# Patient Record
Sex: MALE | Race: WHITE | NOT HISPANIC OR LATINO | Employment: OTHER | URBAN - METROPOLITAN AREA
[De-identification: names, ages, dates, MRNs, and addresses within clinical notes are randomized per-mention and may not be internally consistent; named-entity substitution may affect disease eponyms.]

---

## 2019-03-18 NOTE — PATIENT DISCUSSION
LATTICE DEGENERATION OD: THINNING IN PERIPHERAL RETINA. NO HOLE ALL THE WAY THROUGH THE RETINA  - NO NEED FOR TREATMENT AT THIS TIME. PT TO CALL IF ANY DECREASE IN VISION, INCREASE IN FLOATERS OR FLASHES OF LIGHT.

## 2019-06-26 ENCOUNTER — EST. PATIENT EMERGENCY (OUTPATIENT)
Dept: URBAN - METROPOLITAN AREA CLINIC 43 | Facility: CLINIC | Age: 79
End: 2019-06-26

## 2019-06-26 DIAGNOSIS — H10.33: ICD-10-CM

## 2019-06-26 PROCEDURE — 92012 INTRM OPH EXAM EST PATIENT: CPT

## 2019-06-26 RX ORDER — NEOMYCIN SULFATE, POLYMYXIN B SULFATE AND DEXAMETHASONE 3.5; 10000; 1 MG/ML; [USP'U]/ML; MG/ML: 1 SUSPENSION OPHTHALMIC

## 2019-06-26 ASSESSMENT — VISUAL ACUITY
OD_CC: 20/30
OS_CC: 20/30

## 2019-08-19 ENCOUNTER — ESTABLISHED COMPREHENSIVE EXAM (OUTPATIENT)
Dept: URBAN - METROPOLITAN AREA CLINIC 43 | Facility: CLINIC | Age: 79
End: 2019-08-19

## 2019-08-19 DIAGNOSIS — H50.10: ICD-10-CM

## 2019-08-19 DIAGNOSIS — H18.59: ICD-10-CM

## 2019-08-19 DIAGNOSIS — H04.123: ICD-10-CM

## 2019-08-19 DIAGNOSIS — Z96.1: ICD-10-CM

## 2019-08-19 PROCEDURE — 92015 DETERMINE REFRACTIVE STATE: CPT

## 2019-08-19 PROCEDURE — 92014 COMPRE OPH EXAM EST PT 1/>: CPT

## 2019-08-19 ASSESSMENT — TONOMETRY
OD_IOP_MMHG: 16
OS_IOP_MMHG: 14

## 2019-08-19 ASSESSMENT — VISUAL ACUITY
OD_CC: J1
OD_SC: J1
OS_SC: J3
OS_CC: 20/25-2
OD_SC: 20/40-1
OD_CC: 20/40
OS_CC: J1
OS_SC: 20/25-2

## 2020-11-04 ENCOUNTER — ESTABLISHED COMPREHENSIVE EXAM (OUTPATIENT)
Dept: URBAN - METROPOLITAN AREA CLINIC 43 | Facility: CLINIC | Age: 80
End: 2020-11-04

## 2020-11-04 DIAGNOSIS — H50.10: ICD-10-CM

## 2020-11-04 DIAGNOSIS — H18.599: ICD-10-CM

## 2020-11-04 DIAGNOSIS — H04.123: ICD-10-CM

## 2020-11-04 DIAGNOSIS — Z96.1: ICD-10-CM

## 2020-11-04 PROCEDURE — 92015 DETERMINE REFRACTIVE STATE: CPT

## 2020-11-04 PROCEDURE — 92014 COMPRE OPH EXAM EST PT 1/>: CPT

## 2020-11-04 ASSESSMENT — VISUAL ACUITY
OD_CC: 20/30-2
OD_SC: J3
OS_CC: J1
OS_CC: 20/30-2
OD_CC: J1
OS_SC: 20/30-1+1
OS_SC: J6-
OD_SC: 20/30-1+2

## 2020-11-04 ASSESSMENT — TONOMETRY
OD_IOP_MMHG: 14
OS_IOP_MMHG: 12

## 2022-01-31 ENCOUNTER — COMPREHENSIVE EXAM (OUTPATIENT)
Dept: URBAN - METROPOLITAN AREA CLINIC 43 | Facility: CLINIC | Age: 82
End: 2022-01-31

## 2022-01-31 DIAGNOSIS — H18.593: ICD-10-CM

## 2022-01-31 DIAGNOSIS — H50.10: ICD-10-CM

## 2022-01-31 DIAGNOSIS — H04.123: ICD-10-CM

## 2022-01-31 DIAGNOSIS — Z96.1: ICD-10-CM

## 2022-01-31 PROCEDURE — 92014 COMPRE OPH EXAM EST PT 1/>: CPT

## 2022-01-31 PROCEDURE — 92015 DETERMINE REFRACTIVE STATE: CPT

## 2022-01-31 ASSESSMENT — VISUAL ACUITY
OD_SC: 20/25+2
OS_SC: J4
OS_CC: 20/20-2
OD_SC: J2-
OD_CC: 20/30+1
OS_CC: J1
OD_CC: J1
OS_SC: 20/25-2

## 2022-01-31 ASSESSMENT — TONOMETRY
OS_IOP_MMHG: 12
OD_IOP_MMHG: 12

## 2022-07-05 ENCOUNTER — EMERGENCY VISIT (OUTPATIENT)
Dept: URBAN - METROPOLITAN AREA CLINIC 43 | Facility: CLINIC | Age: 82
End: 2022-07-05

## 2022-07-05 DIAGNOSIS — Z96.1: ICD-10-CM

## 2022-07-05 DIAGNOSIS — H18.593: ICD-10-CM

## 2022-07-05 DIAGNOSIS — H04.123: ICD-10-CM

## 2022-07-05 DIAGNOSIS — H50.10: ICD-10-CM

## 2022-07-05 PROCEDURE — 92012 INTRM OPH EXAM EST PATIENT: CPT

## 2022-07-05 ASSESSMENT — VISUAL ACUITY
OD_CC: 20/40-1
OS_CC: 20/25-2
OD_CC: J1
OS_CC: J2-

## 2023-01-31 ENCOUNTER — COMPREHENSIVE EXAM (OUTPATIENT)
Dept: URBAN - METROPOLITAN AREA CLINIC 43 | Facility: CLINIC | Age: 83
End: 2023-01-31

## 2023-01-31 DIAGNOSIS — H04.123: ICD-10-CM

## 2023-01-31 DIAGNOSIS — Z96.1: ICD-10-CM

## 2023-01-31 DIAGNOSIS — H18.593: ICD-10-CM

## 2023-01-31 DIAGNOSIS — H50.10: ICD-10-CM

## 2023-01-31 PROCEDURE — 92014 COMPRE OPH EXAM EST PT 1/>: CPT

## 2023-01-31 PROCEDURE — 92015 DETERMINE REFRACTIVE STATE: CPT

## 2023-01-31 ASSESSMENT — VISUAL ACUITY
OD_CC: 20/20-1
OD_CC: J1
OS_CC: 20/30-1
OS_CC: J2
OD_SC: J1
OS_SC: 20/40-1
OD_SC: 20/30-2
OS_SC: J6

## 2023-01-31 ASSESSMENT — TONOMETRY
OS_IOP_MMHG: 12
OD_IOP_MMHG: 13

## 2024-02-06 ENCOUNTER — COMPREHENSIVE EXAM (OUTPATIENT)
Dept: URBAN - METROPOLITAN AREA CLINIC 43 | Facility: CLINIC | Age: 84
End: 2024-02-06

## 2024-02-06 DIAGNOSIS — H04.123: ICD-10-CM

## 2024-02-06 DIAGNOSIS — H50.10: ICD-10-CM

## 2024-02-06 DIAGNOSIS — H18.593: ICD-10-CM

## 2024-02-06 DIAGNOSIS — Z96.1: ICD-10-CM

## 2024-02-06 PROCEDURE — 92014 COMPRE OPH EXAM EST PT 1/>: CPT

## 2024-02-06 PROCEDURE — 92015 DETERMINE REFRACTIVE STATE: CPT

## 2024-02-06 ASSESSMENT — VISUAL ACUITY
OD_SC: 20/50
OS_SC: 20/25-1
OD_CC: J1
OD_PH: 20/25-1
OS_SC: J4
OS_CC: 20/25-2
OD_SC: J1
OD_CC: 20/25+2
OS_CC: J1

## 2024-02-06 ASSESSMENT — TONOMETRY
OD_IOP_MMHG: 11
OS_IOP_MMHG: 13

## 2024-06-03 ENCOUNTER — CONTACT LENSES/GLASSES VISIT (OUTPATIENT)
Dept: URBAN - METROPOLITAN AREA CLINIC 43 | Facility: CLINIC | Age: 84
End: 2024-06-03

## 2024-06-03 DIAGNOSIS — H18.593: ICD-10-CM

## 2024-06-03 DIAGNOSIS — H50.10: ICD-10-CM

## 2024-06-03 DIAGNOSIS — H04.123: ICD-10-CM

## 2024-06-03 PROCEDURE — 92015GRNC REFRACTION GLASSES RECHECK - NO CHARGE

## 2024-06-03 ASSESSMENT — VISUAL ACUITY
OS_CC: 20/25-1
OS_CC: J1
OD_CC: 20/25
OD_CC: J1

## 2024-10-25 ENCOUNTER — COMPREHENSIVE EXAM (OUTPATIENT)
Dept: URBAN - METROPOLITAN AREA CLINIC 43 | Facility: CLINIC | Age: 84
End: 2024-10-25

## 2024-10-25 DIAGNOSIS — H04.123: ICD-10-CM

## 2024-10-25 DIAGNOSIS — H18.593: ICD-10-CM

## 2024-10-25 DIAGNOSIS — Z96.1: ICD-10-CM

## 2024-10-25 DIAGNOSIS — H50.10: ICD-10-CM

## 2024-10-25 PROCEDURE — 92014 COMPRE OPH EXAM EST PT 1/>: CPT

## 2024-10-25 PROCEDURE — 92015 DETERMINE REFRACTIVE STATE: CPT
